# Patient Record
Sex: MALE | Race: WHITE | NOT HISPANIC OR LATINO
[De-identification: names, ages, dates, MRNs, and addresses within clinical notes are randomized per-mention and may not be internally consistent; named-entity substitution may affect disease eponyms.]

---

## 2021-05-21 DIAGNOSIS — Z86.39 PERSONAL HISTORY OF OTHER ENDOCRINE, NUTRITIONAL AND METABOLIC DISEASE: ICD-10-CM

## 2021-05-21 DIAGNOSIS — K46.9 UNSPECIFIED ABDOMINAL HERNIA W/OUT OBSTRUCTION OR GANGRENE: ICD-10-CM

## 2021-05-21 DIAGNOSIS — R73.03 PREDIABETES.: ICD-10-CM

## 2021-05-21 DIAGNOSIS — R19.09 OTHER INTRA-ABDOMINAL AND PELVIC SWELLING, MASS AND LUMP: ICD-10-CM

## 2021-05-21 DIAGNOSIS — L72.3 SEBACEOUS CYST: ICD-10-CM

## 2021-05-21 DIAGNOSIS — Z87.898 PERSONAL HISTORY OF OTHER SPECIFIED CONDITIONS: ICD-10-CM

## 2021-05-21 DIAGNOSIS — Z78.9 OTHER SPECIFIED HEALTH STATUS: ICD-10-CM

## 2021-05-21 DIAGNOSIS — R73.9 HYPERGLYCEMIA, UNSPECIFIED: ICD-10-CM

## 2021-05-21 DIAGNOSIS — E78.5 HYPERLIPIDEMIA, UNSPECIFIED: ICD-10-CM

## 2021-05-21 DIAGNOSIS — N50.89 OTHER SPECIFIED DISORDERS OF THE MALE GENITAL ORGANS: ICD-10-CM

## 2021-05-21 DIAGNOSIS — H91.90 UNSPECIFIED HEARING LOSS, UNSPECIFIED EAR: ICD-10-CM

## 2021-05-21 PROBLEM — Z00.00 ENCOUNTER FOR PREVENTIVE HEALTH EXAMINATION: Status: ACTIVE | Noted: 2021-05-21

## 2021-05-21 RX ORDER — MULTIVIT-MIN/IRON/FOLIC ACID/K 18-600-40
CAPSULE ORAL
Refills: 0 | Status: ACTIVE | COMMUNITY

## 2021-05-21 RX ORDER — ATORVASTATIN CALCIUM 20 MG/1
20 TABLET, FILM COATED ORAL
Refills: 0 | Status: ACTIVE | COMMUNITY

## 2021-05-26 ENCOUNTER — APPOINTMENT (OUTPATIENT)
Dept: UROLOGY | Facility: CLINIC | Age: 76
End: 2021-05-26
Payer: MEDICARE

## 2021-05-26 VITALS
SYSTOLIC BLOOD PRESSURE: 134 MMHG | BODY MASS INDEX: 22.21 KG/M2 | DIASTOLIC BLOOD PRESSURE: 77 MMHG | HEART RATE: 74 BPM | WEIGHT: 164 LBS | TEMPERATURE: 97.9 F | HEIGHT: 72 IN

## 2021-05-26 PROCEDURE — 36415 COLL VENOUS BLD VENIPUNCTURE: CPT

## 2021-05-26 PROCEDURE — 99204 OFFICE O/P NEW MOD 45 MIN: CPT

## 2021-05-26 PROCEDURE — 76872 US TRANSRECTAL: CPT

## 2021-05-26 NOTE — ASSESSMENT
[FreeTextEntry1] : This is an initial visit to [facility] for 75-year-old patient whose PSA had been 1.7 and now is 2.4 point\par I repeated the PSA to confirm\par Examiner plus a fine it to be soft and benign and measures of 47.6 g\par A bilateral psoas on shows complete emptying\par The patient's PSA density is well within normal range and there is no suspicion of underlying malignancy\par I asked him to. come back in 6 months just to confirm that there's not  rising on a continuing basis

## 2021-05-26 NOTE — PHYSICAL EXAM
[FreeTextEntry1] : Rising PSA the prostate is benign feeling and MOISE and is measured at 46.72 g on transrectal

## 2021-05-26 NOTE — ADDENDUM
[FreeTextEntry1] : PVR\par Images of the bladder were obtained in the transverse and longitudinal\par Postvoid residual was seen to be negligible\par It measured 17 ml\par \par Transrectal ultrasound to determine prostate size\par With the patient in the left lateral position 5 cc of 2% lidocaine was instilled in the anal canal\par A transrectal ultrasound probe was introduced and images of the prostate were obtained in the transverse and longitudinal\par Prostate gland was seen to be 47.6 g

## 2021-05-27 ENCOUNTER — NON-APPOINTMENT (OUTPATIENT)
Age: 76
End: 2021-05-27

## 2021-05-27 LAB — PSA SERPL-MCNC: 2.25 NG/ML

## 2021-06-25 RX ORDER — SULFAMETHOXAZOLE AND TRIMETHOPRIM 800; 160 MG/1; MG/1
800-160 TABLET ORAL TWICE DAILY
Qty: 42 | Refills: 0 | Status: DISCONTINUED | COMMUNITY
Start: 2021-05-27 | End: 2021-06-25

## 2021-06-29 ENCOUNTER — APPOINTMENT (OUTPATIENT)
Dept: UROLOGY | Facility: CLINIC | Age: 76
End: 2021-06-29
Payer: MEDICARE

## 2021-06-29 VITALS
WEIGHT: 165 LBS | SYSTOLIC BLOOD PRESSURE: 146 MMHG | BODY MASS INDEX: 22.35 KG/M2 | HEIGHT: 72 IN | HEART RATE: 76 BPM | TEMPERATURE: 98 F | DIASTOLIC BLOOD PRESSURE: 74 MMHG

## 2021-06-29 PROCEDURE — 36415 COLL VENOUS BLD VENIPUNCTURE: CPT

## 2021-06-29 PROCEDURE — 99213 OFFICE O/P EST LOW 20 MIN: CPT

## 2021-06-29 NOTE — ASSESSMENT
[FreeTextEntry1] : This is a short-term followup for this 75-year-old patient who noted an elevated PSA when he went from 1.7-2.4\par The prostate was measured at 47.6 g\par Which was acceptable\par He was given a course of inspection says he somewhat better if the PSA is down I will see him in one year\par If the PSA is stable 6 months this is followup to keep a close eye on the rising PSA

## 2021-06-29 NOTE — PHYSICAL EXAM
[General Appearance - Well Developed] : well developed [Normal Appearance] : normal appearance [General Appearance - Well Nourished] : well nourished [Well Groomed] : well groomed [General Appearance - In No Acute Distress] : no acute distress [Abdomen Soft] : soft [Abdomen Tenderness] : non-tender [Costovertebral Angle Tenderness] : no ~M costovertebral angle tenderness [Urethral Meatus] : meatus normal [Urinary Bladder Findings] : the bladder was normal on palpation [Scrotum] : the scrotum was normal [Testes Mass (___cm)] : there were no testicular masses [No Prostate Nodules] : no prostate nodules [FreeTextEntry1] : Prostate has been measured at 46 g, it appears benign on digital rectal exam [Edema] : no peripheral edema [] : no respiratory distress [Respiration, Rhythm And Depth] : normal respiratory rhythm and effort [Exaggerated Use Of Accessory Muscles For Inspiration] : no accessory muscle use [Oriented To Time, Place, And Person] : oriented to person, place, and time [Affect] : the affect was normal [Mood] : the mood was normal [Normal Station and Gait] : the gait and station were normal for the patient's age [Not Anxious] : not anxious [No Focal Deficits] : no focal deficits [No Palpable Adenopathy] : no palpable adenopathy

## 2021-06-30 LAB
ALBUMIN SERPL ELPH-MCNC: 4.5 G/DL
ALP BLD-CCNC: 67 U/L
ALT SERPL-CCNC: 24 U/L
ANION GAP SERPL CALC-SCNC: 14 MMOL/L
AST SERPL-CCNC: 32 U/L
BILIRUB SERPL-MCNC: 0.9 MG/DL
BUN SERPL-MCNC: 17 MG/DL
CALCIUM SERPL-MCNC: 9.4 MG/DL
CHLORIDE SERPL-SCNC: 102 MMOL/L
CO2 SERPL-SCNC: 25 MMOL/L
CREAT SERPL-MCNC: 1.02 MG/DL
GLUCOSE SERPL-MCNC: 110 MG/DL
POTASSIUM SERPL-SCNC: 4.6 MMOL/L
PROT SERPL-MCNC: 7.2 G/DL
PSA SERPL-MCNC: 2.52 NG/ML
SODIUM SERPL-SCNC: 140 MMOL/L

## 2021-08-19 ENCOUNTER — TRANSCRIPTION ENCOUNTER (OUTPATIENT)
Age: 76
End: 2021-08-19

## 2021-11-17 ENCOUNTER — NON-APPOINTMENT (OUTPATIENT)
Age: 76
End: 2021-11-17

## 2021-11-23 ENCOUNTER — APPOINTMENT (OUTPATIENT)
Dept: UROLOGY | Facility: CLINIC | Age: 76
End: 2021-11-23
Payer: MEDICARE

## 2021-11-23 VITALS
HEART RATE: 78 BPM | HEIGHT: 72 IN | SYSTOLIC BLOOD PRESSURE: 157 MMHG | BODY MASS INDEX: 22.48 KG/M2 | WEIGHT: 166 LBS | TEMPERATURE: 97.7 F | DIASTOLIC BLOOD PRESSURE: 84 MMHG

## 2021-11-23 DIAGNOSIS — R10.9 UNSPECIFIED ABDOMINAL PAIN: ICD-10-CM

## 2021-11-23 PROCEDURE — 99214 OFFICE O/P EST MOD 30 MIN: CPT

## 2021-11-23 PROCEDURE — 36415 COLL VENOUS BLD VENIPUNCTURE: CPT

## 2021-11-23 PROCEDURE — 76775 US EXAM ABDO BACK WALL LIM: CPT

## 2021-11-23 NOTE — PHYSICAL EXAM
[General Appearance - Well Developed] : well developed [General Appearance - Well Nourished] : well nourished [Normal Appearance] : normal appearance [Well Groomed] : well groomed [General Appearance - In No Acute Distress] : no acute distress [Abdomen Soft] : soft [Abdomen Tenderness] : non-tender [Costovertebral Angle Tenderness] : no ~M costovertebral angle tenderness [Urethral Meatus] : meatus normal [Urinary Bladder Findings] : the bladder was normal on palpation [Scrotum] : the scrotum was normal [Testes Mass (___cm)] : there were no testicular masses [No Prostate Nodules] : no prostate nodules [FreeTextEntry1] : Os is smooth symmetrical and soft it had been previously measured at about 47 g [Edema] : no peripheral edema [] : no respiratory distress [Respiration, Rhythm And Depth] : normal respiratory rhythm and effort [Exaggerated Use Of Accessory Muscles For Inspiration] : no accessory muscle use [Oriented To Time, Place, And Person] : oriented to person, place, and time [Affect] : the affect was normal [Mood] : the mood was normal [Not Anxious] : not anxious [Normal Station and Gait] : the gait and station were normal for the patient's age [No Focal Deficits] : no focal deficits [No Palpable Adenopathy] : no palpable adenopathy

## 2021-11-23 NOTE — ASSESSMENT
[FreeTextEntry1] : Is her routine six-month followup for this 76-year-old patient who was seen in my office when his PSA went from 1.7 to about 2.4\par At that time the prostate felt very benign it measured 47 g and he was seen in followup with a repeat PSA was stable\par History of today 6 months later repeated a PSA\par I repeated a rectal exam which suggested benign gland\par He is complaining of right flank pain but had no blood in his urine and the pain seemed very positional to me he only fell to a certain motion\par a renal ultrasound was done and shows no abnormality in the dictating\par a repeat his PSA and a CMP for kidney function today

## 2021-11-24 ENCOUNTER — TRANSCRIPTION ENCOUNTER (OUTPATIENT)
Age: 76
End: 2021-11-24

## 2021-11-24 LAB
ALBUMIN SERPL ELPH-MCNC: 4.7 G/DL
ALP BLD-CCNC: 69 U/L
ALT SERPL-CCNC: 23 U/L
ANION GAP SERPL CALC-SCNC: 14 MMOL/L
AST SERPL-CCNC: 29 U/L
BILIRUB SERPL-MCNC: 0.8 MG/DL
BUN SERPL-MCNC: 17 MG/DL
CALCIUM SERPL-MCNC: 9.5 MG/DL
CHLORIDE SERPL-SCNC: 101 MMOL/L
CO2 SERPL-SCNC: 24 MMOL/L
CREAT SERPL-MCNC: 0.95 MG/DL
GLUCOSE SERPL-MCNC: 115 MG/DL
POTASSIUM SERPL-SCNC: 4.6 MMOL/L
PROT SERPL-MCNC: 7.2 G/DL
PSA SERPL-MCNC: 2 NG/ML
SODIUM SERPL-SCNC: 139 MMOL/L

## 2022-11-02 ENCOUNTER — OFFICE (OUTPATIENT)
Dept: URBAN - METROPOLITAN AREA CLINIC 122 | Facility: CLINIC | Age: 77
Setting detail: OPHTHALMOLOGY
End: 2022-11-02
Payer: MEDICARE

## 2022-11-02 DIAGNOSIS — H25.13: ICD-10-CM

## 2022-11-02 DIAGNOSIS — H35.363: ICD-10-CM

## 2022-11-02 DIAGNOSIS — H43.393: ICD-10-CM

## 2022-11-02 PROCEDURE — 92250 FUNDUS PHOTOGRAPHY W/I&R: CPT | Performed by: OPHTHALMOLOGY

## 2022-11-02 PROCEDURE — 92014 COMPRE OPH EXAM EST PT 1/>: CPT | Performed by: OPHTHALMOLOGY

## 2022-11-02 ASSESSMENT — VISUAL ACUITY
OD_BCVA: 20/30
OS_BCVA: 20/40

## 2022-11-02 ASSESSMENT — CONFRONTATIONAL VISUAL FIELD TEST (CVF)
OS_FINDINGS: FULL
OD_FINDINGS: FULL

## 2022-11-02 ASSESSMENT — TONOMETRY
OD_IOP_MMHG: 14
OS_IOP_MMHG: 14

## 2022-11-02 ASSESSMENT — REFRACTION_CURRENTRX
OD_CYLINDER: -1.75
OS_CYLINDER: -0.50
OS_AXIS: 140
OD_ADD: +2.50
OS_ADD: +2.50
OD_AXIS: 25
OS_VPRISM_DIRECTION: PROGS
OS_OVR_VA: 20/
OD_OVR_VA: 20/
OD_VPRISM_DIRECTION: PROGS
OD_SPHERE: -1.00
OS_SPHERE: -1.50

## 2022-11-02 ASSESSMENT — REFRACTION_MANIFEST
OS_VA1: 20/30
OS_SPHERE: -1.50
OS_ADD: +2.50
OS_CYLINDER: -0.50
OD_ADD: +2.50
OD_CYLINDER: -1.75
OD_VA1: 20/40
OD_AXIS: 25
OD_SPHERE: -1.00
OS_AXIS: 140

## 2022-11-02 ASSESSMENT — SPHEQUIV_DERIVED
OD_SPHEQUIV: -1.875
OS_SPHEQUIV: -1.75

## 2022-11-22 ENCOUNTER — APPOINTMENT (OUTPATIENT)
Dept: UROLOGY | Facility: CLINIC | Age: 77
End: 2022-11-22

## 2022-11-22 VITALS
BODY MASS INDEX: 23.03 KG/M2 | HEART RATE: 77 BPM | WEIGHT: 170 LBS | SYSTOLIC BLOOD PRESSURE: 139 MMHG | HEIGHT: 72 IN | DIASTOLIC BLOOD PRESSURE: 84 MMHG

## 2022-11-22 PROCEDURE — 99213 OFFICE O/P EST LOW 20 MIN: CPT

## 2022-11-22 PROCEDURE — 36415 COLL VENOUS BLD VENIPUNCTURE: CPT

## 2022-11-22 NOTE — ASSESSMENT
[FreeTextEntry1] : This is a routine yearly followup this 77-year-old patient who had a mild elevation in his PSA from 2-2.5 and a subsequent return of PSA to lower thighs he has virtually no urinary symptomatology whatsoever\par The voided urine is clear the prostate is smooth and symmetrical on MOISE\par

## 2022-11-22 NOTE — PHYSICAL EXAM
[General Appearance - Well Developed] : well developed [General Appearance - Well Nourished] : well nourished [Normal Appearance] : normal appearance [Well Groomed] : well groomed [General Appearance - In No Acute Distress] : no acute distress [Abdomen Soft] : soft [Abdomen Tenderness] : non-tender [Costovertebral Angle Tenderness] : no ~M costovertebral angle tenderness [Urethral Meatus] : meatus normal [Urinary Bladder Findings] : the bladder was normal on palpation [Scrotum] : the scrotum was normal [Testes Mass (___cm)] : there were no testicular masses [No Prostate Nodules] : no prostate nodules [FreeTextEntry1] : The foreskin was phimotic, the prostate is smooth symmetrical and non concerning on MOISE [Edema] : no peripheral edema [] : no respiratory distress [Respiration, Rhythm And Depth] : normal respiratory rhythm and effort [Exaggerated Use Of Accessory Muscles For Inspiration] : no accessory muscle use [Oriented To Time, Place, And Person] : oriented to person, place, and time [Affect] : the affect was normal [Mood] : the mood was normal [Not Anxious] : not anxious [Normal Station and Gait] : the gait and station were normal for the patient's age [No Focal Deficits] : no focal deficits [No Palpable Adenopathy] : no palpable adenopathy

## 2022-11-23 LAB
ALBUMIN SERPL ELPH-MCNC: 4.6 G/DL
ALP BLD-CCNC: 74 U/L
ALT SERPL-CCNC: 22 U/L
ANION GAP SERPL CALC-SCNC: 10 MMOL/L
AST SERPL-CCNC: 23 U/L
BILIRUB SERPL-MCNC: 0.4 MG/DL
BUN SERPL-MCNC: 17 MG/DL
CALCIUM SERPL-MCNC: 9.5 MG/DL
CHLORIDE SERPL-SCNC: 103 MMOL/L
CO2 SERPL-SCNC: 29 MMOL/L
CREAT SERPL-MCNC: 1.02 MG/DL
EGFR: 76 ML/MIN/1.73M2
GLUCOSE SERPL-MCNC: 112 MG/DL
POTASSIUM SERPL-SCNC: 4.8 MMOL/L
PROT SERPL-MCNC: 7.1 G/DL
PSA SERPL-MCNC: 2.26 NG/ML
SODIUM SERPL-SCNC: 142 MMOL/L

## 2023-11-07 ENCOUNTER — RX ONLY (RX ONLY)
Age: 78
End: 2023-11-07

## 2023-11-07 ENCOUNTER — OFFICE (OUTPATIENT)
Dept: URBAN - METROPOLITAN AREA CLINIC 29 | Facility: CLINIC | Age: 78
Setting detail: OPHTHALMOLOGY
End: 2023-11-07
Payer: MEDICARE

## 2023-11-07 DIAGNOSIS — H00.11: ICD-10-CM

## 2023-11-07 DIAGNOSIS — H25.13: ICD-10-CM

## 2023-11-07 DIAGNOSIS — H43.393: ICD-10-CM

## 2023-11-07 DIAGNOSIS — H35.363: ICD-10-CM

## 2023-11-07 PROCEDURE — 99213 OFFICE O/P EST LOW 20 MIN: CPT | Performed by: OPHTHALMOLOGY

## 2023-11-07 ASSESSMENT — REFRACTION_MANIFEST
OS_AXIS: 140
OD_AXIS: 25
OS_VA1: 20/30
OD_CYLINDER: -1.75
OD_SPHERE: -1.00
OD_VA1: 20/40
OD_ADD: +2.50
OS_SPHERE: -1.50
OS_ADD: +2.50
OS_CYLINDER: -0.50

## 2023-11-07 ASSESSMENT — REFRACTION_CURRENTRX
OS_CYLINDER: -0.50
OS_SPHERE: -1.50
OD_ADD: +2.50
OS_VPRISM_DIRECTION: PROGS
OD_SPHERE: -1.00
OD_VPRISM_DIRECTION: PROGS
OD_CYLINDER: -1.75
OS_AXIS: 140
OD_OVR_VA: 20/
OD_AXIS: 25
OS_ADD: +2.50
OS_OVR_VA: 20/

## 2023-11-07 ASSESSMENT — SPHEQUIV_DERIVED
OS_SPHEQUIV: -1.75
OD_SPHEQUIV: -1.875

## 2023-11-07 ASSESSMENT — CONFRONTATIONAL VISUAL FIELD TEST (CVF)
OD_FINDINGS: FULL
OS_FINDINGS: FULL

## 2023-11-13 ENCOUNTER — APPOINTMENT (OUTPATIENT)
Dept: UROLOGY | Facility: CLINIC | Age: 78
End: 2023-11-13
Payer: MEDICARE

## 2023-11-13 VITALS
HEART RATE: 73 BPM | DIASTOLIC BLOOD PRESSURE: 82 MMHG | SYSTOLIC BLOOD PRESSURE: 143 MMHG | HEIGHT: 72 IN | BODY MASS INDEX: 23.03 KG/M2 | WEIGHT: 170 LBS

## 2023-11-13 PROCEDURE — 99214 OFFICE O/P EST MOD 30 MIN: CPT

## 2023-11-14 LAB — PSA SERPL-MCNC: 2.66 NG/ML

## 2023-11-22 ENCOUNTER — OFFICE (OUTPATIENT)
Dept: URBAN - METROPOLITAN AREA CLINIC 122 | Facility: CLINIC | Age: 78
Setting detail: OPHTHALMOLOGY
End: 2023-11-22
Payer: MEDICARE

## 2023-11-22 DIAGNOSIS — H00.11: ICD-10-CM

## 2023-11-22 DIAGNOSIS — H25.13: ICD-10-CM

## 2023-11-22 PROCEDURE — 99213 OFFICE O/P EST LOW 20 MIN: CPT | Performed by: OPHTHALMOLOGY

## 2023-11-22 ASSESSMENT — REFRACTION_CURRENTRX
OS_CYLINDER: -0.50
OD_CYLINDER: -1.75
OD_VPRISM_DIRECTION: PROGS
OS_AXIS: 140
OD_ADD: +2.50
OS_OVR_VA: 20/
OD_AXIS: 25
OS_SPHERE: -1.50
OS_ADD: +2.50
OD_SPHERE: -1.00
OS_VPRISM_DIRECTION: PROGS
OD_OVR_VA: 20/

## 2023-11-22 ASSESSMENT — CONFRONTATIONAL VISUAL FIELD TEST (CVF)
OD_FINDINGS: FULL
OS_FINDINGS: FULL

## 2023-11-22 ASSESSMENT — REFRACTION_MANIFEST
OS_CYLINDER: -0.50
OD_AXIS: 25
OS_AXIS: 140
OD_ADD: +2.50
OD_VA1: 20/40
OS_VA1: 20/30
OS_ADD: +2.50
OD_SPHERE: -1.00
OS_SPHERE: -1.50
OD_CYLINDER: -1.75

## 2023-11-22 ASSESSMENT — SPHEQUIV_DERIVED
OS_SPHEQUIV: -1.75
OD_SPHEQUIV: -1.875

## 2023-12-24 ENCOUNTER — NON-APPOINTMENT (OUTPATIENT)
Age: 78
End: 2023-12-24

## 2024-01-05 ENCOUNTER — OFFICE (OUTPATIENT)
Dept: URBAN - METROPOLITAN AREA CLINIC 122 | Facility: CLINIC | Age: 79
Setting detail: OPHTHALMOLOGY
End: 2024-01-05
Payer: MEDICARE

## 2024-01-05 DIAGNOSIS — H02.834: ICD-10-CM

## 2024-01-05 DIAGNOSIS — H52.7: ICD-10-CM

## 2024-01-05 DIAGNOSIS — H02.831: ICD-10-CM

## 2024-01-05 DIAGNOSIS — H43.393: ICD-10-CM

## 2024-01-05 DIAGNOSIS — H35.363: ICD-10-CM

## 2024-01-05 DIAGNOSIS — H25.13: ICD-10-CM

## 2024-01-05 DIAGNOSIS — H02.822: ICD-10-CM

## 2024-01-05 DIAGNOSIS — H52.4: ICD-10-CM

## 2024-01-05 PROCEDURE — 92134 CPTRZ OPH DX IMG PST SGM RTA: CPT | Performed by: OPHTHALMOLOGY

## 2024-01-05 PROCEDURE — 92015 DETERMINE REFRACTIVE STATE: CPT | Performed by: OPHTHALMOLOGY

## 2024-01-05 PROCEDURE — 99214 OFFICE O/P EST MOD 30 MIN: CPT | Performed by: OPHTHALMOLOGY

## 2024-01-05 ASSESSMENT — REFRACTION_MANIFEST
OS_ADD: +3.00
OD_AXIS: 30
OD_VA1: 20/40
OS_SPHERE: -2.00
OS_CYLINDER: -0.50
OS_AXIS: 140
OS_VA1: 20/30
OD_CYLINDER: -1.50
OD_ADD: +3.00
OD_VA1: 20/30
OS_ADD: +2.50
OD_CYLINDER: -1.75
OD_SPHERE: -0.75
OS_CYLINDER: SPH
OS_SPHERE: -1.50
OD_ADD: +2.50
OD_SPHERE: -1.00
OD_AXIS: 25
OS_VA1: 20/30

## 2024-01-05 ASSESSMENT — REFRACTION_CURRENTRX
OD_ADD: +2.50
OS_ADD: +2.50
OD_SPHERE: -1.00
OS_VPRISM_DIRECTION: PROGS
OS_OVR_VA: 20/
OD_AXIS: 25
OD_VPRISM_DIRECTION: PROGS
OD_OVR_VA: 20/
OS_AXIS: 140
OS_CYLINDER: -0.50
OS_SPHERE: -1.50
OD_CYLINDER: -1.75

## 2024-01-05 ASSESSMENT — REFRACTION_AUTOREFRACTION
OS_AXIS: 22
OD_SPHERE: -0.75
OD_CYLINDER: -0.25
OD_AXIS: 15
OS_SPHERE: -2.00
OS_CYLINDER: -0.25

## 2024-01-05 ASSESSMENT — SPHEQUIV_DERIVED
OD_SPHEQUIV: -1.5
OD_SPHEQUIV: -0.875
OD_SPHEQUIV: -1.875
OS_SPHEQUIV: -1.75
OS_SPHEQUIV: -2.125

## 2024-01-05 ASSESSMENT — LID POSITION - DERMATOCHALASIS
OS_DERMATOCHALASIS: LUL 2+
OD_DERMATOCHALASIS: RUL 1+

## 2024-01-05 ASSESSMENT — CONFRONTATIONAL VISUAL FIELD TEST (CVF)
OS_FINDINGS: FULL
OD_FINDINGS: FULL

## 2024-01-12 ENCOUNTER — OFFICE (OUTPATIENT)
Dept: URBAN - METROPOLITAN AREA CLINIC 122 | Facility: CLINIC | Age: 79
Setting detail: OPHTHALMOLOGY
End: 2024-01-12
Payer: MEDICARE

## 2024-01-12 DIAGNOSIS — H25.13: ICD-10-CM

## 2024-01-12 DIAGNOSIS — H02.822: ICD-10-CM

## 2024-01-12 DIAGNOSIS — H01.002: ICD-10-CM

## 2024-01-12 DIAGNOSIS — H01.005: ICD-10-CM

## 2024-01-12 PROBLEM — H02.831 DERMATOCHALASIS; RIGHT UPPER LID, LEFT UPPER LID: Status: ACTIVE | Noted: 2024-01-05

## 2024-01-12 PROBLEM — H02.834 DERMATOCHALASIS; RIGHT UPPER LID, LEFT UPPER LID: Status: ACTIVE | Noted: 2024-01-05

## 2024-01-12 PROCEDURE — 99213 OFFICE O/P EST LOW 20 MIN: CPT | Performed by: OPHTHALMOLOGY

## 2024-01-12 ASSESSMENT — REFRACTION_AUTOREFRACTION
OD_SPHERE: -0.75
OS_CYLINDER: -0.25
OS_AXIS: 22
OS_SPHERE: -2.00
OD_AXIS: 15
OD_CYLINDER: -0.25

## 2024-01-12 ASSESSMENT — REFRACTION_MANIFEST
OD_CYLINDER: -1.75
OS_SPHERE: -2.00
OS_AXIS: 140
OD_AXIS: 25
OS_ADD: +3.00
OS_CYLINDER: SPH
OD_AXIS: 30
OD_VA1: 20/30
OS_VA1: 20/30
OS_CYLINDER: -0.50
OS_VA1: 20/30
OS_ADD: +2.50
OD_ADD: +2.50
OD_ADD: +3.00
OD_SPHERE: -0.75
OS_SPHERE: -1.50
OD_CYLINDER: -1.50
OD_VA1: 20/40
OD_SPHERE: -1.00

## 2024-01-12 ASSESSMENT — REFRACTION_CURRENTRX
OS_CYLINDER: -0.50
OS_VPRISM_DIRECTION: PROGS
OS_AXIS: 140
OD_ADD: +2.50
OS_ADD: +2.50
OD_AXIS: 25
OS_SPHERE: -1.50
OD_SPHERE: -1.00
OD_OVR_VA: 20/
OD_VPRISM_DIRECTION: PROGS
OD_CYLINDER: -1.75
OS_OVR_VA: 20/

## 2024-01-12 ASSESSMENT — LID POSITION - DERMATOCHALASIS
OD_DERMATOCHALASIS: RUL 1+
OS_DERMATOCHALASIS: LUL 2+

## 2024-01-12 ASSESSMENT — SPHEQUIV_DERIVED
OS_SPHEQUIV: -1.75
OD_SPHEQUIV: -1.875
OD_SPHEQUIV: -1.5
OD_SPHEQUIV: -0.875
OS_SPHEQUIV: -2.125

## 2024-01-12 ASSESSMENT — LID EXAM ASSESSMENTS
OD_BLEPHARITIS: RLL 2+
OS_BLEPHARITIS: LLL 1+

## 2024-03-07 ENCOUNTER — APPOINTMENT (OUTPATIENT)
Dept: UROLOGY | Facility: CLINIC | Age: 79
End: 2024-03-07
Payer: MEDICARE

## 2024-03-07 VITALS
WEIGHT: 170 LBS | HEIGHT: 72 IN | SYSTOLIC BLOOD PRESSURE: 147 MMHG | HEART RATE: 71 BPM | DIASTOLIC BLOOD PRESSURE: 76 MMHG | BODY MASS INDEX: 23.03 KG/M2

## 2024-03-07 DIAGNOSIS — N47.1 PHIMOSIS: ICD-10-CM

## 2024-03-07 DIAGNOSIS — N40.1 BENIGN PROSTATIC HYPERPLASIA WITH LOWER URINARY TRACT SYMPMS: ICD-10-CM

## 2024-03-07 DIAGNOSIS — R97.20 ELEVATED PROSTATE, SPECIFIC ANTIGEN [PSA]: ICD-10-CM

## 2024-03-07 PROCEDURE — 99214 OFFICE O/P EST MOD 30 MIN: CPT

## 2024-03-08 LAB — PSA SERPL-MCNC: 2.74 NG/ML

## 2024-03-19 NOTE — PHYSICAL EXAM
[General Appearance - Well Developed] : well developed [Normal Appearance] : normal appearance [General Appearance - In No Acute Distress] : no acute distress [Respiration, Rhythm And Depth] : normal respiratory rhythm and effort [Abdomen Soft] : soft [Abdomen Hernia] : no hernia was discovered [Urethral Meatus] : meatus normal [Scrotum] : the scrotum was normal [Epididymis] : the epididymides were normal [Testes Tenderness] : no tenderness of the testes [Testes Mass (___cm)] : there were no testicular masses [Anus Abnormality] : the anus and perineum were normal [Rectal Exam - Rectum] : digital rectal exam was normal [Prostate Tenderness] : the prostate was not tender [No Prostate Nodules] : no prostate nodules [Prostate Size ___ gm] : prostate size [unfilled] gm [] : no rash [Oriented To Time, Place, And Person] : oriented to person, place, and time [Inguinal Lymph Nodes Enlarged Bilaterally] : inguinal [de-identified] : phimotic foreskin

## 2024-03-19 NOTE — ASSESSMENT
Pt reports syncope Friday 2/24/2023. Pt reports ever since she has had left hip pain. Pt ambulatory at triage.  No obvious sign of deformity [FreeTextEntry1] : Patient is a 78-year-old male with BPH and rising PSA.. He has had no interval hematuria, dysuria or pelvic pain. He is voiding well with no new modifying factors. His physical exam shows an enlarged prostate but smooth and a nodular and he also has mild phimosis which he says is not bothersome.  Assessment and plan 1. BPH 2. Rising PSA MOISE is stable today we will check a PSA. 3. Phimosis I discussed options with the patient at this point he would like to observe but I told him if it becomes increasingly difficult to retract the foreskin we could always do a dorsal slit or a full circumcision. I will call him with the PSA results and he will return in 1 year.

## 2024-10-31 ENCOUNTER — APPOINTMENT (OUTPATIENT)
Dept: UROLOGY | Facility: CLINIC | Age: 79
End: 2024-10-31

## 2024-12-12 ENCOUNTER — APPOINTMENT (OUTPATIENT)
Dept: UROLOGY | Facility: CLINIC | Age: 79
End: 2024-12-12
Payer: MEDICARE

## 2024-12-12 VITALS
SYSTOLIC BLOOD PRESSURE: 132 MMHG | BODY MASS INDEX: 23.3 KG/M2 | WEIGHT: 172 LBS | HEART RATE: 98 BPM | HEIGHT: 72 IN | DIASTOLIC BLOOD PRESSURE: 72 MMHG

## 2024-12-12 DIAGNOSIS — N47.1 PHIMOSIS: ICD-10-CM

## 2024-12-12 DIAGNOSIS — R97.20 ELEVATED PROSTATE, SPECIFIC ANTIGEN [PSA]: ICD-10-CM

## 2024-12-12 DIAGNOSIS — N40.1 BENIGN PROSTATIC HYPERPLASIA WITH LOWER URINARY TRACT SYMPMS: ICD-10-CM

## 2024-12-12 PROCEDURE — 51798 US URINE CAPACITY MEASURE: CPT

## 2024-12-12 PROCEDURE — 99213 OFFICE O/P EST LOW 20 MIN: CPT

## 2025-01-14 ENCOUNTER — OFFICE (OUTPATIENT)
Dept: URBAN - METROPOLITAN AREA CLINIC 122 | Facility: CLINIC | Age: 80
Setting detail: OPHTHALMOLOGY
End: 2025-01-14
Payer: MEDICARE

## 2025-01-14 DIAGNOSIS — H01.005: ICD-10-CM

## 2025-01-14 DIAGNOSIS — H25.13: ICD-10-CM

## 2025-01-14 DIAGNOSIS — H43.393: ICD-10-CM

## 2025-01-14 DIAGNOSIS — H01.002: ICD-10-CM

## 2025-01-14 DIAGNOSIS — H02.822: ICD-10-CM

## 2025-01-14 DIAGNOSIS — H02.834: ICD-10-CM

## 2025-01-14 DIAGNOSIS — H02.831: ICD-10-CM

## 2025-01-14 DIAGNOSIS — H35.363: ICD-10-CM

## 2025-01-14 PROCEDURE — 92250 FUNDUS PHOTOGRAPHY W/I&R: CPT | Performed by: OPHTHALMOLOGY

## 2025-01-14 PROCEDURE — 92014 COMPRE OPH EXAM EST PT 1/>: CPT | Performed by: OPHTHALMOLOGY

## 2025-01-14 ASSESSMENT — TONOMETRY
OS_IOP_MMHG: 19
OD_IOP_MMHG: 18

## 2025-01-14 ASSESSMENT — REFRACTION_CURRENTRX
OD_AXIS: 27
OS_ADD: +2.50
OS_SPHERE: -1.50
OD_CYLINDER: -1.75
OS_OVR_VA: 20/
OS_OVR_VA: 20/
OS_VPRISM_DIRECTION: PROGS
OD_SPHERE: -1.00
OD_CYLINDER: -2.00
OS_AXIS: 151
OS_AXIS: 140
OS_ADD: +2.50
OD_ADD: +2.50
OD_ADD: +2.50
OD_OVR_VA: 20/
OS_CYLINDER: -1.00
OD_VPRISM_DIRECTION: PROGS
OD_OVR_VA: 20/
OD_SPHERE: -0.50
OS_SPHERE: -1.50
OS_CYLINDER: -0.50
OD_AXIS: 25

## 2025-01-14 ASSESSMENT — REFRACTION_MANIFEST
OS_ADD: +3.00
OS_CYLINDER: -0.50
OD_SPHERE: -1.00
OS_SPHERE: -2.00
OD_AXIS: 30
OS_AXIS: 140
OD_VA1: 20/30
OS_ADD: +2.50
OS_SPHERE: -1.50
OS_VA1: 20/30
OD_SPHERE: -0.75
OS_CYLINDER: SPH
OD_AXIS: 25
OD_VA1: 20/40
OD_CYLINDER: -1.50
OD_ADD: +3.00
OD_CYLINDER: -1.75
OD_ADD: +2.50
OS_VA1: 20/30

## 2025-01-14 ASSESSMENT — REFRACTION_AUTOREFRACTION
OS_SPHERE: -1.75
OS_CYLINDER: -0.50
OD_CYLINDER: -1.50
OS_AXIS: 75
OD_SPHERE: -1.00
OD_AXIS: 9

## 2025-01-14 ASSESSMENT — LID EXAM ASSESSMENTS
OS_BLEPHARITIS: LLL 1+
OD_BLEPHARITIS: RLL 2+

## 2025-01-14 ASSESSMENT — CONFRONTATIONAL VISUAL FIELD TEST (CVF)
OS_FINDINGS: FULL
OD_FINDINGS: FULL

## 2025-01-14 ASSESSMENT — LID POSITION - DERMATOCHALASIS
OD_DERMATOCHALASIS: RUL 1+
OS_DERMATOCHALASIS: LUL 2+

## 2025-01-14 ASSESSMENT — VISUAL ACUITY
OS_BCVA: 20/25
OD_BCVA: 20/25-2

## 2025-03-05 ENCOUNTER — APPOINTMENT (OUTPATIENT)
Dept: UROLOGY | Facility: CLINIC | Age: 80
End: 2025-03-05
Payer: MEDICARE

## 2025-03-05 VITALS
SYSTOLIC BLOOD PRESSURE: 162 MMHG | DIASTOLIC BLOOD PRESSURE: 66 MMHG | HEART RATE: 64 BPM | HEIGHT: 72 IN | WEIGHT: 175 LBS | BODY MASS INDEX: 23.7 KG/M2

## 2025-03-05 DIAGNOSIS — N40.1 BENIGN PROSTATIC HYPERPLASIA WITH LOWER URINARY TRACT SYMPMS: ICD-10-CM

## 2025-03-05 PROCEDURE — 99213 OFFICE O/P EST LOW 20 MIN: CPT

## 2025-03-09 LAB
PSA FREE FLD-MCNC: 32 %
PSA FREE SERPL-MCNC: 0.89 NG/ML
PSA SERPL-MCNC: 2.77 NG/ML

## 2025-06-25 ASSESSMENT — REFRACTION_CURRENTRX
OS_AXIS: 140
OD_ADD: +2.50
OS_OVR_VA: 20/
OS_OVR_VA: 20/
OD_OVR_VA: 20/
OD_ADD: +2.50
OD_AXIS: 25
OS_CYLINDER: -0.50
OD_AXIS: 27
OS_ADD: +2.50
OS_SPHERE: -1.50
OS_CYLINDER: -1.00
OD_SPHERE: -0.50
OS_ADD: +2.50
OD_OVR_VA: 20/
OD_CYLINDER: -2.00
OD_CYLINDER: -1.75
OS_VPRISM_DIRECTION: PROGS
OD_VPRISM_DIRECTION: PROGS
OS_AXIS: 151
OS_SPHERE: -1.50
OD_SPHERE: -1.00

## 2025-06-25 ASSESSMENT — REFRACTION_MANIFEST
OS_SPHERE: -1.50
OD_SPHERE: -1.00
OS_VA1: 20/30
OS_AXIS: 140
OD_ADD: +3.00
OS_ADD: +3.00
OS_VA1: 20/30
OD_VA1: 20/30
OD_AXIS: 30
OS_CYLINDER: -0.50
OS_ADD: +2.50
OD_SPHERE: -0.75
OD_ADD: +2.50
OD_CYLINDER: -1.75
OD_VA1: 20/40
OS_CYLINDER: SPH
OS_SPHERE: -2.00
OD_CYLINDER: -1.50
OD_AXIS: 25

## 2025-09-16 ENCOUNTER — NON-APPOINTMENT (OUTPATIENT)
Age: 80
End: 2025-09-16

## 2025-09-18 ENCOUNTER — APPOINTMENT (OUTPATIENT)
Dept: UROLOGY | Facility: CLINIC | Age: 80
End: 2025-09-18
Payer: MEDICARE

## 2025-09-18 VITALS
SYSTOLIC BLOOD PRESSURE: 120 MMHG | BODY MASS INDEX: 23.7 KG/M2 | HEIGHT: 72 IN | WEIGHT: 175 LBS | DIASTOLIC BLOOD PRESSURE: 75 MMHG | OXYGEN SATURATION: 100 % | HEART RATE: 70 BPM

## 2025-09-18 DIAGNOSIS — N47.1 PHIMOSIS: ICD-10-CM

## 2025-09-18 DIAGNOSIS — N40.1 BENIGN PROSTATIC HYPERPLASIA WITH LOWER URINARY TRACT SYMPMS: ICD-10-CM

## 2025-09-18 DIAGNOSIS — Z12.5 ENCOUNTER FOR SCREENING FOR MALIGNANT NEOPLASM OF PROSTATE: ICD-10-CM

## 2025-09-18 PROCEDURE — 99213 OFFICE O/P EST LOW 20 MIN: CPT

## 2025-09-18 PROCEDURE — 51798 US URINE CAPACITY MEASURE: CPT

## 2025-09-19 PROBLEM — Z12.5 PROSTATE CANCER SCREENING: Status: ACTIVE | Noted: 2025-09-19
